# Patient Record
Sex: FEMALE | Race: OTHER | HISPANIC OR LATINO | URBAN - METROPOLITAN AREA
[De-identification: names, ages, dates, MRNs, and addresses within clinical notes are randomized per-mention and may not be internally consistent; named-entity substitution may affect disease eponyms.]

---

## 2024-05-24 ENCOUNTER — EMERGENCY (EMERGENCY)
Facility: HOSPITAL | Age: 24
LOS: 1 days | Discharge: HOME | End: 2024-05-24
Attending: EMERGENCY MEDICINE
Payer: SELF-PAY

## 2024-05-24 VITALS
SYSTOLIC BLOOD PRESSURE: 107 MMHG | RESPIRATION RATE: 16 BRPM | HEART RATE: 59 BPM | WEIGHT: 147.71 LBS | TEMPERATURE: 99 F | DIASTOLIC BLOOD PRESSURE: 65 MMHG | OXYGEN SATURATION: 98 % | HEIGHT: 62 IN

## 2024-05-24 PROCEDURE — 99283 EMERGENCY DEPT VISIT LOW MDM: CPT

## 2024-05-24 RX ORDER — HYDROCORTISONE 1 %
1 OINTMENT (GRAM) TOPICAL
Qty: 3 | Refills: 0
Start: 2024-05-24 | End: 2024-05-30

## 2024-05-24 RX ORDER — DIPHENHYDRAMINE HCL 50 MG
50 CAPSULE ORAL ONCE
Refills: 0 | Status: COMPLETED | OUTPATIENT
Start: 2024-05-24 | End: 2024-05-24

## 2024-05-24 RX ADMIN — Medication 50 MILLIGRAM(S): at 20:10

## 2024-05-24 NOTE — ED ADULT NURSE NOTE - NSFALLUNIVINTERV_ED_ALL_ED
Bed/Stretcher in lowest position, wheels locked, appropriate side rails in place/Call bell, personal items and telephone in reach/Instruct patient to call for assistance before getting out of bed/chair/stretcher/Non-slip footwear applied when patient is off stretcher/[place] to call system/Physically safe environment - no spills, clutter or unnecessary equipment/Purposeful proactive rounding/Room/bathroom lighting operational, light cord in reach

## 2024-05-24 NOTE — ED PROVIDER NOTE - CLINICAL SUMMARY MEDICAL DECISION MAKING FREE TEXT BOX
24-year-old female with no past medical history presents with reports that she returned on Tuesday from [place] and Wednesday she woke up with an itchy rash mainly on her legs but a small portion of her arms.  Patient reports trying calamine lotion with no relief.  Patient reports she was sitting in the sand and there was a lot of bugs. No one else with symptoms.  Patient denies fevers, body aches.    papular erythematous rash noted mainly to posterior aspect of legs - also noted anterior aspect of legs and posterior aspect of b/l elbows. - no oral or respiratory involvement. likely secondary to bug bites. No fevers - no suspicion for malaria or dengue at this time. Will give benadryl and hydrocortisone and have patient follow up with dermatology if symptoms do not improve.

## 2024-05-24 NOTE — ED PROVIDER NOTE - OBJECTIVE STATEMENT
24-year-old female with no past medical history presents with reports that she returned on Tuesday from [place] and Wednesday she woke up with an itchy rash mainly on her legs but a small portion of her arms.  Patient reports trying calamine lotion with no relief.  Patient reports she was sitting in the sand and there was a lot of bugs. No one else with symptoms.  Patient denies fevers, body aches.

## 2024-05-24 NOTE — ED PROVIDER NOTE - NSFOLLOWUPCLINICS_GEN_ALL_ED_FT
[facility] [place]  Dermatology  [address], [address]  [place] [address]  Phone: [phone]  Fax: [phone]    [facility]  Dermatology  [address], [address]  [place] [address]  Phone: [phone]  Fax:

## 2024-05-24 NOTE — ED PROVIDER NOTE - ATTENDING APP SHARED VISIT CONTRIBUTION OF CARE
Seen with ACP patient returns from [place] complaining of rash to behind her legs there is evidence of a maculopapular rash that is.  Pruritic.  No fever no chills no nausea no vomiting no diarrhea no aches or pains likely insect bites agree with ACPs assessment history and physical and disposition

## 2024-05-24 NOTE — ED PROVIDER NOTE - NSFOLLOWUPINSTRUCTIONS_ED_ALL_ED_FT
Follow-up with dermatology if symptoms do not improve in 1 week.    You can take Benadryl 50 mg every 6 hours as needed for itching    Hydrocortisone sent to the pharmacy you can use that 2 times a day    Follow up with your primary care doctor within 1 week.     You can take Motrin (ibuprofen) 600 mg every 6 hours or Tylenol (acetaminophen) 1000 mg every 6 hours as needed for pain or fever.     If you experience any new or worsening symptoms or if you are concerned you can always come back to the emergency for a re-evaluation.

## 2024-05-24 NOTE — ED PROVIDER NOTE - PATIENT PORTAL LINK FT
You can access the FollowMyHealth Patient Portal offered by [facility] by registering at the following website: [email or web address]. By joining [facility]’s FollowMyHealth portal, you will also be able to view your health information using other applications (apps) compatible with our system.

## 2024-05-24 NOTE — ED PROVIDER NOTE - PHYSICAL EXAMINATION
papular erythematous rash noted mainly to posterior aspect of legs - also noted anterior aspect of legs and posterior aspect of b/l elbows.

## 2024-06-07 PROBLEM — Z00.00 ENCOUNTER FOR PREVENTIVE HEALTH EXAMINATION: Status: ACTIVE | Noted: 2024-06-07

## 2024-06-12 ENCOUNTER — APPOINTMENT (OUTPATIENT)
Dept: DERMATOLOGY | Facility: CLINIC | Age: 24
End: 2024-06-12